# Patient Record
Sex: FEMALE | Race: BLACK OR AFRICAN AMERICAN | NOT HISPANIC OR LATINO | Employment: FULL TIME | URBAN - METROPOLITAN AREA
[De-identification: names, ages, dates, MRNs, and addresses within clinical notes are randomized per-mention and may not be internally consistent; named-entity substitution may affect disease eponyms.]

---

## 2018-03-12 ENCOUNTER — HOSPITAL ENCOUNTER (EMERGENCY)
Facility: HOSPITAL | Age: 23
Discharge: HOME | End: 2018-03-13
Attending: EMERGENCY MEDICINE
Payer: MEDICAID

## 2018-03-12 VITALS
BODY MASS INDEX: 32.89 KG/M2 | HEART RATE: 75 BPM | SYSTOLIC BLOOD PRESSURE: 132 MMHG | DIASTOLIC BLOOD PRESSURE: 79 MMHG | WEIGHT: 217 LBS | OXYGEN SATURATION: 100 % | RESPIRATION RATE: 20 BRPM | HEIGHT: 68 IN | TEMPERATURE: 98 F

## 2018-03-12 DIAGNOSIS — R03.0 ELEVATED BLOOD PRESSURE READING: ICD-10-CM

## 2018-03-12 DIAGNOSIS — G51.0 RIGHT-SIDED BELL'S PALSY: Primary | ICD-10-CM

## 2018-03-12 DIAGNOSIS — R29.810 FACIAL DROOP: ICD-10-CM

## 2018-03-12 PROCEDURE — 99284 EMERGENCY DEPT VISIT MOD MDM: CPT

## 2018-03-13 PROCEDURE — 25000003 PHARM REV CODE 250: Performed by: NURSE PRACTITIONER

## 2018-03-13 PROCEDURE — 63600175 PHARM REV CODE 636 W HCPCS: Performed by: NURSE PRACTITIONER

## 2018-03-13 RX ORDER — PREDNISONE 20 MG/1
60 TABLET ORAL DAILY
Qty: 21 TABLET | Refills: 0 | Status: SHIPPED | OUTPATIENT
Start: 2018-03-13 | End: 2018-03-20

## 2018-03-13 RX ORDER — PREDNISONE 20 MG/1
60 TABLET ORAL
Status: COMPLETED | OUTPATIENT
Start: 2018-03-13 | End: 2018-03-13

## 2018-03-13 RX ORDER — VALACYCLOVIR HYDROCHLORIDE 1 G/1
1000 TABLET, FILM COATED ORAL EVERY 12 HOURS
Qty: 14 TABLET | Refills: 0 | Status: SHIPPED | OUTPATIENT
Start: 2018-03-13 | End: 2019-03-26 | Stop reason: CLARIF

## 2018-03-13 RX ORDER — VALACYCLOVIR HYDROCHLORIDE 500 MG/1
1000 TABLET, FILM COATED ORAL
Status: COMPLETED | OUTPATIENT
Start: 2018-03-13 | End: 2018-03-13

## 2018-03-13 RX ADMIN — PREDNISONE 60 MG: 20 TABLET ORAL at 01:03

## 2018-03-13 RX ADMIN — VALACYCLOVIR HYDROCHLORIDE 1000 MG: 500 TABLET, FILM COATED ORAL at 01:03

## 2018-03-13 NOTE — ED PROVIDER NOTES
SCRIBE #1 NOTE: ILeslie, am scribing for, and in the presence of, Davide Willis NP I have scribed the entire note.      History      Chief Complaint   Patient presents with    Numbness     pt c/o R sided facial numbness and drooping       Review of patient's allergies indicates:  No Known Allergies     HPI   HPI    3/12/2018, 11:57 PM   History obtained from the patient      History of Present Illness: Grecia Cardenas is a 22 y.o. female patient with FHx of Bell's Palsy who presents to the Emergency Department for R sided facial numbness/droop and tongue numbness which began today. She states sxs were present when she woke up this morning. Symptoms are constant and moderate in severity. No mitigating or exacerbating factors reported. She reports having a R sided migraine HA earlier today, but the HA has resolved. Patient denies fever, chills, N/V, CP, SOB, extremity weakness, extremity numbness, dizziness, slurred speech, confusion, and all other sxs at this time. No further complaints or concerns at this time.       Arrival mode: Personal vehicle    PCP: Samuel Damon MD       Past Medical History:  History reviewed. No pertinent past medical history.    Past Surgical History:  History reviewed. No pertinent surgical history.      Family History:  History reviewed. No pertinent family history.    Social History:  Social History     Social History Main Topics    Smoking status: Never Smoker    Smokeless tobacco: Never Used    Alcohol use Yes      Comment: social    Drug use: No    Sexual activity: Unknown       ROS   Review of Systems   Constitutional: Negative for chills and fever.   HENT: Negative for sore throat.    Respiratory: Negative for shortness of breath.    Cardiovascular: Negative for chest pain.   Gastrointestinal: Negative for nausea and vomiting.   Genitourinary: Negative for dysuria.   Musculoskeletal: Negative for back pain.   Skin: Negative for rash.   Neurological:  "Positive for numbness (R sided facial, tongue) and headaches (resolved). Negative for dizziness, tremors, seizures, syncope, speech difficulty, weakness and light-headedness.   Hematological: Does not bruise/bleed easily.   Psychiatric/Behavioral: Negative for confusion.   All other systems reviewed and are negative.      Physical Exam      Initial Vitals [03/12/18 2243]   BP Pulse Resp Temp SpO2   132/79 75 20 98.4 °F (36.9 °C) 100 %      MAP       96.67          Physical Exam  Nursing Notes and Vital Signs Reviewed.  Constitutional: Patient is in no acute distress. Awake and alert. Well-developed and well-nourished.  Head: Atraumatic. Normocephalic.  Eyes: PERRL. EOM intact. Conjunctivae are not pale. No scleral icterus.  ENT: Mucous membranes are moist. Oropharynx is clear and symmetric.    Neck: Supple. Full ROM. No lymphadenopathy.  Cardiovascular: Regular rate. Regular rhythm. No murmurs, rubs, or gallops. Distal pulses are 2+ and symmetric.  Pulmonary/Chest: No respiratory distress. Clear to auscultation bilaterally. No wheezing, rales, or rhonchi.  Abdominal: Soft and non-distended.  There is no tenderness.  No rebound, guarding, or rigidity.  Good bowel sounds.    Musculoskeletal: Moves all extremities. No obvious deformities. No edema. No calf tenderness.  Skin: Warm and dry.  Neurological:  Alert, awake, and appropriate.  Normal speech. R sided facial droop. R eye is unable to close completely at times. Pt unable to wrinkle her forehead with rising of her eyebrows.  Psychiatric: Normal affect. Good eye contact. Appropriate in content.    ED Course    Procedures  ED Vital Signs:  Vitals:    03/12/18 2243   BP: 132/79   Pulse: 75   Resp: 20   Temp: 98.4 °F (36.9 °C)   TempSrc: Oral   SpO2: 100%   Weight: 98.4 kg (217 lb)   Height: 5' 8" (1.727 m)       Imaging Results:  Interpreted by virtual imaging.  Study: CT Head without IV contrast  Findings: Per virtual imaging, normal head/brain CT.    The Emergency " Provider reviewed the vital signs and test results, which are outlined above.    ED Discussion     12:55 AM: Patient has been informed that CT head result is normal and there are NAF. Discussed pt dx and plan of tx. Informed pt to follow up with PCP. All questions and concerns were addressed at this time. Pt expresses understanding of information and instructions, and is comfortable with plan to discharge. Pt is stable for discharge.    I discussed with patient that evaluation in the ED does not suggest any emergent or life threatening medical conditions requiring immediate intervention beyond what was provided in the ED, and I believe patient is safe for discharge.  Regardless, an unremarkable evaluation in the ED does not preclude the development or presence of a serious of life threatening condition. As such, patient was instructed to return immediately for any worsening or change in current symptoms.    Pre-hypertension/Hypertension: The pt has been informed that they may have pre-hypertension or hypertension based on a blood pressure reading in the ED. I recommend that the pt call the PCP listed on their discharge instructions or a physician of their choice this week to arrange f/u for further evaluation of possible pre-hypertension or hypertension.       ED Medication(s):  Medications   predniSONE tablet 60 mg (60 mg Oral Given 3/13/18 0103)   valACYclovir tablet 1,000 mg (1,000 mg Oral Given 3/13/18 0103)       Discharge Medication List as of 3/13/2018 12:56 AM      START taking these medications    Details   predniSONE (DELTASONE) 20 MG tablet Take 3 tablets (60 mg total) by mouth once daily., Starting Tue 3/13/2018, Until Tue 3/20/2018, Print      valACYclovir (VALTREX) 1000 MG tablet Take 1 tablet (1,000 mg total) by mouth every 12 (twelve) hours., Starting Tue 3/13/2018, Until Tue 3/20/2018, Print             Follow-up Information     Samuel Damon MD. Schedule an appointment as soon as possible for a  visit in 1 day.    Specialty:  Anesthesiology  Contact information:  [address]  [place] [facility] [address]  [phone]             [facility] - .    Specialty:  Emergency Medicine  Why:  As needed, If symptoms worsen  Contact information:  [address]  [facility] [address] [phone]                  Medical Decision Making    Medical Decision Making:   Clinical Tests:   Radiological Study: Ordered and Reviewed           Scribe Attestation:   Scribe #1: I performed the above scribed service and the documentation accurately describes the services I performed. I attest to the accuracy of the note.    Attending:   Physician Attestation Statement for Scribe #1: I, Davide Willis NP, personally performed the services described in this documentation, as scribed by Leslie Jacob, in my presence, and it is both accurate and complete.          Clinical Impression       ICD-10-CM ICD-9-CM   1. Right-sided Bell's palsy G51.0 351.0   2. Facial droop R29.810 781.94   3. Elevated blood pressure reading R03.0 796.2       Disposition:   Disposition: Discharged  Condition: Stable         Davide Willis NP  03/13/18 9315

## 2018-09-18 ENCOUNTER — HOSPITAL ENCOUNTER (EMERGENCY)
Facility: HOSPITAL | Age: 23
Discharge: HOME | End: 2018-09-18
Attending: EMERGENCY MEDICINE
Payer: MEDICAID

## 2018-09-18 VITALS
TEMPERATURE: 99 F | OXYGEN SATURATION: 97 % | SYSTOLIC BLOOD PRESSURE: 142 MMHG | DIASTOLIC BLOOD PRESSURE: 83 MMHG | HEIGHT: 68 IN | WEIGHT: 220.81 LBS | BODY MASS INDEX: 33.47 KG/M2 | RESPIRATION RATE: 19 BRPM | HEART RATE: 89 BPM

## 2018-09-18 DIAGNOSIS — R03.0 ELEVATED BLOOD PRESSURE READING WITHOUT DIAGNOSIS OF HYPERTENSION: ICD-10-CM

## 2018-09-18 DIAGNOSIS — H65.01 RIGHT ACUTE SEROUS OTITIS MEDIA, RECURRENCE NOT SPECIFIED: Primary | ICD-10-CM

## 2018-09-18 PROCEDURE — 99283 EMERGENCY DEPT VISIT LOW MDM: CPT

## 2018-09-18 RX ORDER — FLUTICASONE PROPIONATE 50 MCG
1 SPRAY, SUSPENSION (ML) NASAL 2 TIMES DAILY PRN
Qty: 15 G | Refills: 0 | Status: SHIPPED | OUTPATIENT
Start: 2018-09-18 | End: 2019-03-26 | Stop reason: CLARIF

## 2018-09-18 RX ORDER — CETIRIZINE HYDROCHLORIDE 10 MG/1
10 TABLET ORAL DAILY
Qty: 30 TABLET | Refills: 0 | COMMUNITY
Start: 2018-09-18 | End: 2019-03-26 | Stop reason: CLARIF

## 2018-09-19 NOTE — ED PROVIDER NOTES
SCRIBE #1 NOTE: I, Marisol Harper, am scribing for, and in the presence of, Ra Meraz MD. I have scribed the entire note.        History     Chief Complaint   Patient presents with    Otalgia     both ears x 3 days.      Review of patient's allergies indicates:  No Known Allergies      History of Present Illness     HPI    9/18/2018, 11:19 PM  History obtained from the patient      History of Present Illness: Grecia Cardenas is a 23 y.o. female patient who presents to the Emergency Department for evaluation of three day hx of b/l ear pain with associated sneezing Pt states that her constant, 3/10 improves when eardrops are present in the canal, but notes that this relief is transitory. Pt denies fever, leg pain/swelling, CP, cough, N/V/D, congestion, chills, sore throat, HA, or dizziness.     Arrival mode: Personal vehicle    PCP: Samuel Damon MD        Past Medical History:  None reported.    Past Surgical History:  None reported      Family History:  None reported    Social History:  Social History     Tobacco Use    Smoking status: None reported   Substance and Sexual Activity    Alcohol use: None reported    Drug use: None reported    Sexual activity: None reported      Review of Systems     Review of Systems   Constitutional: Negative for activity change, appetite change, chills, diaphoresis, fatigue and fever.   HENT: Positive for ear pain (b/l) and sneezing. Negative for congestion, nosebleeds, sinus pain, sore throat and trouble swallowing.    Eyes: Negative for pain and discharge.   Respiratory: Negative for cough, chest tightness, shortness of breath, wheezing and stridor.    Cardiovascular: Negative for chest pain, palpitations and leg swelling.   Gastrointestinal: Negative for abdominal distention, abdominal pain, blood in stool, constipation, diarrhea, nausea and vomiting.   Genitourinary: Negative for difficulty urinating, dysuria, flank pain, frequency, hematuria and  "urgency.   Musculoskeletal: Negative for arthralgias, back pain, myalgias and neck pain.        Negative for LE pain   Skin: Negative for pallor, rash and wound.   Neurological: Negative for dizziness, syncope, weakness, light-headedness, numbness and headaches.   Hematological: Does not bruise/bleed easily.   Psychiatric/Behavioral: Negative for confusion and self-injury.   All other systems reviewed and are negative.     Physical Exam     Initial Vitals [09/18/18 2301]   BP Pulse Resp Temp SpO2   (!) 142/83 89 19 98.5 °F (36.9 °C) 97 %      MAP       --          Physical Exam  Nursing Notes and Vital Signs Reviewed.  Constitutional: Patient is in no acute distress. Well-developed and well-nourished.  Head: Atraumatic. Normocephalic.  Eyes: PERRL. EOM intact. Conjunctivae are not pale. No scleral icterus.  ENT: Mucous membranes are moist. Oropharynx is clear and symmetric. Clear serous fluid behind L TM. R TM nml.   Neck: Supple. Full ROM. No lymphadenopathy.  Cardiovascular: Regular rate. Regular rhythm. No murmurs, rubs, or gallops. Distal pulses are 2+ and symmetric.  Pulmonary/Chest: No respiratory distress. Clear to auscultation bilaterally. No wheezing or rales.  Abdominal: Soft and non-distended.  There is no tenderness.  No rebound, guarding, or rigidity.  Musculoskeletal: Moves all extremities. No obvious deformities.  Skin: Warm and dry.  Neurological:  Alert, awake, and appropriate.  Normal speech.  No acute focal neurological deficits are appreciated.  Psychiatric: Normal affect. Good eye contact. Appropriate in content.     ED Course     Procedures    ED Vital Signs:  Vitals:    09/18/18 2301   BP: (!) 142/83   Pulse: 89   Resp: 19   Temp: 98.5 °F (36.9 °C)   TempSrc: Oral   SpO2: 97%   Weight: 100.1 kg (220 lb 12.7 oz)   Height: 5' 8" (1.727 m)       Abnormal Lab Results:  None ordered    All Lab Results:  None ordered    Imaging Results:  Imaging Results    None          The EKG was ordered, " reviewed, and independently interpreted by the ED provider.  None           The Emergency Provider reviewed the vital signs and test results, which are outlined above.     ED Discussion     23:19: Initial exam described above. Discussed with pt all pertinent ED information and results. Discussed pt dx of right acute serous otitis media and plan of tx. Prescribed Zyrtec and Flonase. Recommended sleeping with head propped up. Gave pt all f/u and return to the ED instructions. All questions and concerns were addressed at this time. Pt expresses understanding of information and instructions, and is comfortable with plan to discharge. Pt is stable for discharge.    Pre-hypertension/Hypertension: The pt has been informed that they may have pre-hypertension or hypertension based on a blood pressure reading in the ED. I recommend that the pt call the PCP listed on their discharge instructions or a physician of their choice this week to arrange f/u for further evaluation of possible pre-hypertension or hypertension.       ED Medication(s):  Medications - No data to display    Current Discharge Medication List      START taking these medications    Details   cetirizine (ZYRTEC) 10 MG tablet Take 1 tablet (10 mg total) by mouth once daily.  Qty: 30 tablet, Refills: 0      fluticasone (FLONASE) 50 mcg/actuation nasal spray 1 spray (50 mcg total) by Each Nare route 2 (two) times daily as needed for Rhinitis.  Qty: 15 g, Refills: 0           Follow-up Information     Samuel Oneill MD. Schedule an appointment as soon as possible for a visit in 3 days.    Specialty:  Anesthesiology  Why:  for reassessment  Contact information:  [facility] [address]  [place] [facility]  [facility] [address] [phone]                      Medical Decision Making                 Scribe Attestation:   Scribe #1: I performed the above scribed service and the documentation accurately describes the services I performed. I attest to the accuracy of  the note. 09/18/2018 11:29 PM    Attending:   Physician Attestation Statement for Scribe #1: I, Ra Meraz MD, personally performed the services described in this documentation, as scribed by Marisol Harper, in my presence, and it is both accurate and complete.           Clinical Impression       ICD-10-CM ICD-9-CM   1. Right acute serous otitis media, recurrence not specified H65.01 381.01   2. Elevated blood pressure reading without diagnosis of hypertension R03.0 796.2       Disposition:   Disposition: Discharged  Condition: Stable             Ra Meraz MD  09/21/18 0314

## 2019-03-25 PROCEDURE — 99284 EMERGENCY DEPT VISIT MOD MDM: CPT

## 2019-03-26 ENCOUNTER — HOSPITAL ENCOUNTER (EMERGENCY)
Facility: HOSPITAL | Age: 24
Discharge: HOME | End: 2019-03-26
Attending: EMERGENCY MEDICINE
Payer: MEDICAID

## 2019-03-26 VITALS
HEIGHT: 69 IN | SYSTOLIC BLOOD PRESSURE: 144 MMHG | RESPIRATION RATE: 20 BRPM | TEMPERATURE: 101 F | HEART RATE: 94 BPM | DIASTOLIC BLOOD PRESSURE: 84 MMHG | WEIGHT: 206.38 LBS | OXYGEN SATURATION: 95 % | BODY MASS INDEX: 30.57 KG/M2

## 2019-03-26 DIAGNOSIS — R03.0 ELEVATED BLOOD PRESSURE READING: ICD-10-CM

## 2019-03-26 DIAGNOSIS — R50.9 FEVER, UNSPECIFIED FEVER CAUSE: ICD-10-CM

## 2019-03-26 DIAGNOSIS — J10.1 INFLUENZA A: Primary | ICD-10-CM

## 2019-03-26 DIAGNOSIS — R05.9 COUGH: ICD-10-CM

## 2019-03-26 LAB
INFLUENZA A, MOLECULAR: POSITIVE
INFLUENZA B, MOLECULAR: NEGATIVE
SPECIMEN SOURCE: ABNORMAL

## 2019-03-26 PROCEDURE — 25000003 PHARM REV CODE 250: Performed by: PHYSICIAN ASSISTANT

## 2019-03-26 PROCEDURE — 87502 INFLUENZA DNA AMP PROBE: CPT

## 2019-03-26 RX ORDER — PROMETHAZINE HYDROCHLORIDE AND DEXTROMETHORPHAN HYDROBROMIDE 6.25; 15 MG/5ML; MG/5ML
5 SYRUP ORAL NIGHTLY PRN
Qty: 120 ML | Refills: 0 | Status: SHIPPED | OUTPATIENT
Start: 2019-03-26 | End: 2022-03-31

## 2019-03-26 RX ORDER — ACETAMINOPHEN 500 MG
1000 TABLET ORAL
Status: COMPLETED | OUTPATIENT
Start: 2019-03-26 | End: 2019-03-26

## 2019-03-26 RX ORDER — OSELTAMIVIR PHOSPHATE 75 MG/1
75 CAPSULE ORAL 2 TIMES DAILY
Qty: 10 CAPSULE | Refills: 0 | Status: SHIPPED | OUTPATIENT
Start: 2019-03-26 | End: 2019-03-31

## 2019-03-26 RX ADMIN — ACETAMINOPHEN 1000 MG: 500 TABLET ORAL at 12:03

## 2019-03-26 NOTE — ED PROVIDER NOTES
History      Chief Complaint   Patient presents with    Generalized Body Aches     +headache, cough, nasal congestion x 2 days       Review of patient's allergies indicates:  No Known Allergies     HPI   HPI    3/26/2019, 12:18 AM   History obtained from the patient      History of Present Illness: Grecia Cardenas is a 23 y.o. female patient who presents to the Emergency Department for body aches x 2 days.  Associated symptoms include frontal headache, coughing, nasal congestion, rhinorrhea, fever, post tussive vomiting.    Denies sore throat, otalgia, diarrhea, chest pain, SOB, headache, dizziness.  Treatments tried include Nyquil.  Patient states that she is tolerating liquids.       Arrival mode: Personal vehicle      PCP: [facility]       Past Medical History:  History reviewed. No pertinent past medical history.    Past Surgical History:  History reviewed. No pertinent surgical history.      Family History:  History reviewed. No pertinent family history.    Social History:  Social History     Tobacco Use    Smoking status: Never Smoker    Smokeless tobacco: Never Used   Substance and Sexual Activity    Alcohol use: Yes     Comment: social    Drug use: No    Sexual activity: Not on file       ROS   Review of Systems   Constitutional: Positive for fever. Negative for chills.   HENT: Positive for congestion and rhinorrhea. Negative for ear pain and sore throat.    Eyes: Negative for discharge and redness.   Respiratory: Positive for cough. Negative for wheezing.    Cardiovascular: Negative for chest pain and palpitations.   Gastrointestinal: Positive for vomiting. Negative for constipation and diarrhea.   Genitourinary: Negative for dysuria and hematuria.   Musculoskeletal: Negative for back pain and neck pain.   Skin: Negative for rash and wound.   Neurological: Negative for dizziness and headaches.       Physical Exam      Initial Vitals [03/25/19 2239]   BP Pulse Resp Temp SpO2  "  (!) 144/84 94 20 (!) 100.5 °F (38.1 °C) 95 %      MAP       --          Physical Exam  Nursing Notes and Vital Signs Reviewed.  Constitutional: Patient is in no apparent distress. Awake and alert. Well-developed and well-nourished.  Head: Atraumatic. Normocephalic.  Eyes: PERRL. EOM intact. Conjunctivae are not pale. No scleral icterus.  ENT: Mucous membranes are moist. Oropharynx is clear and symmetric.  Nasal congestion.  Bilateral TM unremarkable.  Neck: Supple. Full ROM. No lymphadenopathy.  Cardiovascular: Regular rate. Regular rhythm. No murmurs, rubs, or gallops. Distal pulses are 2+ and symmetric.  Pulmonary/Chest: No respiratory distress. Clear to auscultation bilaterally. No wheezing, rales, or rhonchi.  Cough noted.  Abdominal: Soft and non-distended.  There is no tenderness.  No rebound, guarding, or rigidity.   Musculoskeletal: Moves all extremities. No obvious deformities. No edema. No calf tenderness.  Skin: Warm and dry.  Neurological:  Alert, awake, and appropriate.  Normal speech.  No acute focal neurological deficits are appreciated.  Psychiatric: Normal affect. Good eye contact. Appropriate in content.    ED Course    Procedures  ED Vital Signs:  Vitals:    03/25/19 2239 03/26/19 0038   BP: (!) 144/84    Pulse: 94    Resp: 20    Temp: (!) 100.5 °F (38.1 °C) (!) 100.5 °F (38.1 °C)   TempSrc: Oral    SpO2: 95%    Weight: 93.6 kg (206 lb 5.6 oz)    Height: 5' 9" (1.753 m)        Abnormal Lab Results:  Labs Reviewed   INFLUENZA A & B BY MOLECULAR - Abnormal; Notable for the following components:       Result Value    Influenza A, Molecular Positive (*)     All other components within normal limits        All Lab Results:  Results for orders placed or performed during the hospital encounter of 03/26/19   Influenza A & B by Molecular   Result Value Ref Range    Influenza A, Molecular Positive (A) Negative    Influenza B, Molecular Negative Negative    Flu A & B Source Nasal swab          Imaging " Results:  Imaging Results    None                 The Emergency Provider reviewed the vital signs and test results, which are outlined above.    ED Discussion     1:40 AM: Reassessed pt at this time.  Pt states her condition has improved at this time. Discussed with pt all pertinent ED information and results. Discussed pt dx and plan of tx. Gave pt all f/u and return to the ED instructions. All questions and concerns were addressed at this time. Pt expresses understanding of information and instructions, and is comfortable with plan to discharge. Pt is stable for discharge.      I discussed with patient and/or family/caretaker that evaluation in the ED does not suggest any emergent or life threatening medical conditions requiring immediate intervention beyond what was provided in the ED, and I believe patient is safe for discharge.  Regardless, an unremarkable evaluation in the ED does not preclude the development or presence of a serious of life threatening condition. As such, patient was instructed to return immediately for any worsening or change in current symptoms.    Pre-hypertension/Hypertension: The pt has been informed that they may have pre-hypertension or hypertension based on a blood pressure reading in the ED. I recommend that the pt call the PCP listed on their discharge instructions or a physician of their choice this week to arrange f/u for further evaluation of possible pre-hypertension or hypertension.       ED Medication(s):  Medications   acetaminophen tablet 1,000 mg (1,000 mg Oral Given 3/26/19 0038)       Discharge Medication List as of 3/26/2019  1:39 AM      START taking these medications    Details   oseltamivir (TAMIFLU) 75 MG capsule Take 1 capsule (75 mg total) by mouth 2 (two) times daily. for 5 days, Starting Tue 3/26/2019, Until Sun 3/31/2019, Print      promethazine-dextromethorphan (PROMETHAZINE-DM) 6.25-15 mg/5 mL Syrp Take 5 mLs by mouth nightly as needed., Starting Tue  3/26/2019, Print             Follow-up Information     [facility]. Schedule an appointment as soon as possible for a visit in 3 days.    Contact information:  [address] [facility]  [place] [address]    Phone:  [phone]                   Medical Decision Making        Additional MDM:   Hypertension: The patient has hypertension (no treatment required at this time). The patient's condition was felt to be stable.            Clinical Impression       ICD-10-CM ICD-9-CM   1. Influenza A J10.1 487.1   2. Fever, unspecified fever cause R50.9 780.60   3. Cough R05 786.2   4. Elevated blood pressure reading R03.0 796.2       Disposition:   Disposition: Discharged  Condition: Stable           Marlene Carrero PA-C  03/28/19 1106

## 2022-03-26 ENCOUNTER — HOSPITAL ENCOUNTER (EMERGENCY)
Facility: HOSPITAL | Age: 27
Discharge: HOME | End: 2022-03-27
Attending: EMERGENCY MEDICINE
Payer: COMMERCIAL

## 2022-03-26 DIAGNOSIS — N93.8 DUB (DYSFUNCTIONAL UTERINE BLEEDING): Primary | ICD-10-CM

## 2022-03-26 LAB
B-HCG UR QL: NEGATIVE
BACTERIA #/AREA URNS HPF: NORMAL /HPF
BILIRUB UR QL STRIP: NEGATIVE
CLARITY UR: CLEAR
COLOR UR: YELLOW
GLUCOSE UR QL STRIP: NEGATIVE
HGB UR QL STRIP: ABNORMAL
KETONES UR QL STRIP: NEGATIVE
LEUKOCYTE ESTERASE UR QL STRIP: NEGATIVE
MICROSCOPIC COMMENT: NORMAL
NITRITE UR QL STRIP: NEGATIVE
PH UR STRIP: 7 [PH] (ref 5–8)
PROT UR QL STRIP: NEGATIVE
RBC #/AREA URNS HPF: 2 /HPF (ref 0–4)
SP GR UR STRIP: 1.02 (ref 1–1.03)
URN SPEC COLLECT METH UR: ABNORMAL
UROBILINOGEN UR STRIP-ACNC: NEGATIVE EU/DL

## 2022-03-26 PROCEDURE — 99283 EMERGENCY DEPT VISIT LOW MDM: CPT

## 2022-03-26 PROCEDURE — 81000 URINALYSIS NONAUTO W/SCOPE: CPT | Performed by: NURSE PRACTITIONER

## 2022-03-26 PROCEDURE — 81025 URINE PREGNANCY TEST: CPT | Performed by: NURSE PRACTITIONER

## 2022-03-26 RX ORDER — TRANEXAMIC ACID 650 MG/1
1300 TABLET ORAL 2 TIMES DAILY
Qty: 20 TABLET | Refills: 0 | Status: SHIPPED | OUTPATIENT
Start: 2022-03-26 | End: 2022-03-31

## 2022-03-27 VITALS
DIASTOLIC BLOOD PRESSURE: 74 MMHG | TEMPERATURE: 98 F | SYSTOLIC BLOOD PRESSURE: 153 MMHG | RESPIRATION RATE: 16 BRPM | WEIGHT: 223.44 LBS | BODY MASS INDEX: 33 KG/M2 | HEART RATE: 74 BPM | OXYGEN SATURATION: 96 %

## 2022-03-27 NOTE — ED PROVIDER NOTES
Encounter Date: 3/26/2022       History     Chief Complaint   Patient presents with    Vaginal Discharge     Pt has been c/o of vaginal discharge and increased clotting since Thursday. Pt does have PCOS     The history is provided by the patient.   Female  Problem  Primary symptoms include discharge, vaginal bleeding.    Primary symptoms include no pelvic pain, no fever, no dyspareunia, no genital lesions, no genital pain, no genital odor, no dysuria.   This is a new problem. The current episode started several weeks ago (VAGINAL BLEEDING SINCE 03/08/22). The problem occurs most days (Pt reports vaginal spotting on panty liner and after urination on and off since 03/08/22. She reports last week she noted tissue in toilet. She denies being pregnant or taking home UPT to rule out pregnancy. ). The problem has been unchanged. The symptoms occur during urination. The patient's menstrual history has been irregular. The discharge was bloody. Pertinent negatives include no anorexia, no abdominal swelling, no abdominal pain, no constipation, no diarrhea, no nausea, no vomiting, no frequency and no dizziness. She has tried nothing for the symptoms. Sexual activity: sexually active. There is no concern regarding sexually transmitted diseases. (pt reports had negative PAP SMEAR 6 months ago) Associated medical issues include STD and ovarian cysts. Associated medical issues comments: PCOS, uterine fibroids, DUB.     Review of patient's allergies indicates:  No Known Allergies  No past medical history on file.  No past surgical history on file.  No family history on file.  Social History     Tobacco Use    Smoking status: Never Smoker    Smokeless tobacco: Never Used   Substance Use Topics    Alcohol use: Yes     Comment: social    Drug use: No     Review of Systems   Constitutional: Negative for chills and fever.   HENT: Negative for congestion, ear pain, sinus pain and sore throat.    Eyes: Negative for pain.    Respiratory: Negative for cough, chest tightness and shortness of breath.    Cardiovascular: Negative for chest pain.   Gastrointestinal: Negative for abdominal pain, anorexia, constipation, diarrhea, nausea and vomiting.   Genitourinary: Positive for vaginal bleeding. Negative for decreased urine volume, difficulty urinating, dyspareunia, dysuria, flank pain, frequency, genital sores, hematuria, pelvic pain, vaginal discharge and vaginal pain.   Musculoskeletal: Negative for back pain, gait problem and neck pain.   Skin: Negative for rash.   Neurological: Negative for dizziness, syncope, weakness and headaches.       Physical Exam     Initial Vitals [03/26/22 2233]   BP Pulse Resp Temp SpO2   (!) 161/93 75 18 98.3 °F (36.8 °C) 96 %      MAP       --         Physical Exam    Nursing note and vitals reviewed.  Constitutional: Vital signs are normal. No distress.   HENT:   Head: Normocephalic and atraumatic.   Eyes: Conjunctivae and EOM are normal. Pupils are equal, round, and reactive to light.   Neck: Neck supple.   Normal range of motion.  Cardiovascular: Normal rate, regular rhythm and normal heart sounds.   Pulmonary/Chest: Breath sounds normal. No respiratory distress.   Abdominal: Abdomen is soft. Bowel sounds are normal. There is no abdominal tenderness. There is no rebound and no guarding.   Genitourinary:    Rectum normal.      Pelvic exam was performed with patient supine.   There is no rash, tenderness, lesion or injury on the right labia. There is no rash, tenderness, lesion or injury on the left labia.    Vaginal bleeding present.      No vaginal discharge or tenderness.   There is bleeding in the vagina. No tenderness in the vagina.    Genitourinary Comments: Pt declined bimanual exam.   No CMT, lesions, discharge, bleeding, clots, or tissue noted at Cervical Os upon visualization during speculum exam.  No abnormalities seen on cervix or internal vagina during exam.  Chaperone present at bedside.      Musculoskeletal:         General: Normal range of motion.      Cervical back: Normal range of motion and neck supple.     Neurological: She is alert and oriented to person, place, and time.   Skin: Skin is warm and dry.         ED Course   Procedures  Labs Reviewed   URINALYSIS, REFLEX TO URINE CULTURE - Abnormal; Notable for the following components:       Result Value    Occult Blood UA 3+ (*)     All other components within normal limits    Narrative:     Specimen Source->Urine   PREGNANCY TEST, URINE RAPID    Narrative:     Specimen Source->Urine   URINALYSIS MICROSCOPIC    Narrative:     Specimen Source->Urine         Results for orders placed or performed during the hospital encounter of 03/26/22   Pregnancy, urine rapid   Result Value Ref Range    Preg Test, Ur Negative    Urinalysis, Reflex to Urine Culture Urine, Clean Catch    Specimen: Urine   Result Value Ref Range    Specimen UA Urine, Clean Catch     Color, UA Yellow Yellow, Straw, Kate    Appearance, UA Clear Clear    pH, UA 7.0 5.0 - 8.0    Specific Gravity, UA 1.025 1.005 - 1.030    Protein, UA Negative Negative    Glucose, UA Negative Negative    Ketones, UA Negative Negative    Bilirubin (UA) Negative Negative    Occult Blood UA 3+ (A) Negative    Nitrite, UA Negative Negative    Urobilinogen, UA Negative <2.0 EU/dL    Leukocytes, UA Negative Negative   Urinalysis Microscopic   Result Value Ref Range    RBC, UA 2 0 - 4 /hpf    Bacteria Occasional None-Occ /hpf    Microscopic Comment SEE COMMENT          Imaging Results    None          Medications - No data to display       DUB (dysfunctional uterine bleeding)    Other orders  -     Pregnancy, urine rapid; Standing  -     Setup Pelvic Tray; Standing  -     Urinalysis, Reflex to Urine Culture Urine, Clean Catch; Standing  -     Cancel: Vaginal Screen; Standing  -     Urinalysis Microscopic; Standing  -     tranexamic acid (LYSTEDA) 650 mg tablet; Take 2 tablets (1,300 mg total) by mouth 2  (two) times daily. for 5 days  Dispense: 20 tablet; Refill: 0                  11:57 PM3: I discussed with patient and/or family/caretaker her diagnosis of DUB. Counseled pt on vaginal bleeding precautions.  Informed patient to call her OBGYN on Monday to clarify whether she should start taking Tranexamic acid Any worse vaginal bleeding or abdominal pain should be evaluated immediately by her GYN, PCP, or in the ED. D/w and added to discharge paper work all signs and symptoms of when to return immediately to ED. Answered their questions at this time. Pt understands and agrees with plan at this time. Pt has remained stable through ED course and is stable for discharge.     I discussed with patient and/or family/caretaker that evaluation in the ED does not suggest any emergent or life threatening medical conditions requiring immediate intervention beyond what was provided in the ED, and I believe patient is safe for discharge.  Regardless, an unremarkable evaluation in the ED does not preclude the development or presence of a serious of life threatening condition. As such, patient was instructed to return immediately for any worsening or change in current symptoms.           Clinical Impression:   Final diagnoses:  [N93.8] DUB (dysfunctional uterine bleeding) (Primary)          ED Disposition Condition    Discharge Stable        ED Prescriptions     Medication Sig Dispense Start Date End Date Auth. Provider    tranexamic acid (LYSTEDA) 650 mg tablet Take 2 tablets (1,300 mg total) by mouth 2 (two) times daily. for 5 days 20 tablet 3/26/2022 3/31/2022 Jodee Triana NP        Follow-up Information     Follow up With Specialties Details Why Contact Info    Alejo Taylor MD Obstetrics and Gynecology, Surgery In 1 week Follow up with your doctor for further evaluation, Return to ED for any concerns. [address]  [facility] [address]  [phone]             Jodee Triana NP  03/26/22  [id number]